# Patient Record
Sex: FEMALE | Race: BLACK OR AFRICAN AMERICAN | NOT HISPANIC OR LATINO | Employment: FULL TIME | URBAN - METROPOLITAN AREA
[De-identification: names, ages, dates, MRNs, and addresses within clinical notes are randomized per-mention and may not be internally consistent; named-entity substitution may affect disease eponyms.]

---

## 2023-07-24 ENCOUNTER — HOSPITAL ENCOUNTER (OUTPATIENT)
Facility: CLINIC | Age: 53
Setting detail: OBSERVATION
Discharge: HOME | End: 2023-07-26
Attending: EMERGENCY MEDICINE | Admitting: EMERGENCY MEDICINE
Payer: COMMERCIAL

## 2023-07-24 DIAGNOSIS — F10.20 ALCOHOL USE DISORDER, SEVERE, DEPENDENCE (H): ICD-10-CM

## 2023-07-24 DIAGNOSIS — F10.939 ALCOHOL WITHDRAWAL SYNDROME WITH COMPLICATION (H): ICD-10-CM

## 2023-07-24 DIAGNOSIS — F39 MOOD DISORDER (H): ICD-10-CM

## 2023-07-24 LAB
ALBUMIN SERPL BCG-MCNC: 4.3 G/DL (ref 3.5–5.2)
ALP SERPL-CCNC: 48 U/L (ref 35–104)
ALT SERPL W P-5'-P-CCNC: 44 U/L (ref 0–50)
AMPHETAMINES UR QL SCN: ABNORMAL
ANION GAP SERPL CALCULATED.3IONS-SCNC: 11 MMOL/L (ref 7–15)
AST SERPL W P-5'-P-CCNC: 30 U/L (ref 0–45)
BARBITURATES UR QL SCN: ABNORMAL
BASOPHILS # BLD AUTO: 0 10E3/UL (ref 0–0.2)
BASOPHILS NFR BLD AUTO: 0 %
BENZODIAZ UR QL SCN: ABNORMAL
BILIRUB SERPL-MCNC: 0.4 MG/DL
BUN SERPL-MCNC: 7.6 MG/DL (ref 6–20)
BZE UR QL SCN: ABNORMAL
CALCIUM SERPL-MCNC: 8.7 MG/DL (ref 8.6–10)
CANNABINOIDS UR QL SCN: ABNORMAL
CHLORIDE SERPL-SCNC: 97 MMOL/L (ref 98–107)
CREAT SERPL-MCNC: 0.77 MG/DL (ref 0.51–0.95)
DEPRECATED HCO3 PLAS-SCNC: 29 MMOL/L (ref 22–29)
EOSINOPHIL # BLD AUTO: 0.1 10E3/UL (ref 0–0.7)
EOSINOPHIL NFR BLD AUTO: 1 %
ERYTHROCYTE [DISTWIDTH] IN BLOOD BY AUTOMATED COUNT: 12.3 % (ref 10–15)
ETHANOL SERPL-MCNC: <0.01 G/DL
GFR SERPL CREATININE-BSD FRML MDRD: >90 ML/MIN/1.73M2
GLUCOSE SERPL-MCNC: 164 MG/DL (ref 70–99)
HCT VFR BLD AUTO: 38.2 % (ref 35–47)
HGB BLD-MCNC: 12.8 G/DL (ref 11.7–15.7)
IMM GRANULOCYTES # BLD: 0 10E3/UL
IMM GRANULOCYTES NFR BLD: 0 %
LYMPHOCYTES # BLD AUTO: 1.4 10E3/UL (ref 0.8–5.3)
LYMPHOCYTES NFR BLD AUTO: 13 %
MAGNESIUM SERPL-MCNC: 2.3 MG/DL (ref 1.7–2.3)
MCH RBC QN AUTO: 30.1 PG (ref 26.5–33)
MCHC RBC AUTO-ENTMCNC: 33.5 G/DL (ref 31.5–36.5)
MCV RBC AUTO: 90 FL (ref 78–100)
MONOCYTES # BLD AUTO: 0.6 10E3/UL (ref 0–1.3)
MONOCYTES NFR BLD AUTO: 6 %
NEUTROPHILS # BLD AUTO: 8.4 10E3/UL (ref 1.6–8.3)
NEUTROPHILS NFR BLD AUTO: 80 %
NRBC # BLD AUTO: 0 10E3/UL
NRBC BLD AUTO-RTO: 0 /100
OPIATES UR QL SCN: ABNORMAL
PCP QUAL URINE (ROCHE): ABNORMAL
PLATELET # BLD AUTO: 265 10E3/UL (ref 150–450)
POTASSIUM SERPL-SCNC: 3 MMOL/L (ref 3.4–5.3)
PROT SERPL-MCNC: 7 G/DL (ref 6.4–8.3)
RBC # BLD AUTO: 4.25 10E6/UL (ref 3.8–5.2)
SODIUM SERPL-SCNC: 137 MMOL/L (ref 136–145)
WBC # BLD AUTO: 10.5 10E3/UL (ref 4–11)

## 2023-07-24 PROCEDURE — 99222 1ST HOSP IP/OBS MODERATE 55: CPT | Performed by: NURSE PRACTITIONER

## 2023-07-24 PROCEDURE — 85025 COMPLETE CBC W/AUTO DIFF WBC: CPT | Performed by: EMERGENCY MEDICINE

## 2023-07-24 PROCEDURE — 80307 DRUG TEST PRSMV CHEM ANLYZR: CPT | Performed by: NURSE PRACTITIONER

## 2023-07-24 PROCEDURE — 250N000013 HC RX MED GY IP 250 OP 250 PS 637: Performed by: NURSE PRACTITIONER

## 2023-07-24 PROCEDURE — 258N000003 HC RX IP 258 OP 636: Performed by: EMERGENCY MEDICINE

## 2023-07-24 PROCEDURE — 96360 HYDRATION IV INFUSION INIT: CPT

## 2023-07-24 PROCEDURE — 82947 ASSAY GLUCOSE BLOOD QUANT: CPT | Performed by: EMERGENCY MEDICINE

## 2023-07-24 PROCEDURE — 96361 HYDRATE IV INFUSION ADD-ON: CPT

## 2023-07-24 PROCEDURE — G0378 HOSPITAL OBSERVATION PER HR: HCPCS

## 2023-07-24 PROCEDURE — 99285 EMERGENCY DEPT VISIT HI MDM: CPT | Mod: 25

## 2023-07-24 PROCEDURE — 90791 PSYCH DIAGNOSTIC EVALUATION: CPT

## 2023-07-24 PROCEDURE — 250N000013 HC RX MED GY IP 250 OP 250 PS 637: Performed by: EMERGENCY MEDICINE

## 2023-07-24 PROCEDURE — 83735 ASSAY OF MAGNESIUM: CPT | Performed by: EMERGENCY MEDICINE

## 2023-07-24 PROCEDURE — 80175 DRUG SCREEN QUAN LAMOTRIGINE: CPT | Performed by: NURSE PRACTITIONER

## 2023-07-24 PROCEDURE — 36415 COLL VENOUS BLD VENIPUNCTURE: CPT | Performed by: EMERGENCY MEDICINE

## 2023-07-24 PROCEDURE — 82077 ASSAY SPEC XCP UR&BREATH IA: CPT | Performed by: EMERGENCY MEDICINE

## 2023-07-24 RX ORDER — DIAZEPAM 5 MG
5 TABLET ORAL
Status: DISCONTINUED | OUTPATIENT
Start: 2023-07-24 | End: 2023-07-26 | Stop reason: HOSPADM

## 2023-07-24 RX ORDER — ESTRADIOL 1 MG/1
1 TABLET ORAL
COMMUNITY
Start: 2023-03-17

## 2023-07-24 RX ORDER — ONDANSETRON 4 MG/1
4 TABLET, ORALLY DISINTEGRATING ORAL EVERY 6 HOURS PRN
Status: DISCONTINUED | OUTPATIENT
Start: 2023-07-24 | End: 2023-07-26 | Stop reason: HOSPADM

## 2023-07-24 RX ORDER — LAMOTRIGINE 25 MG/1
25 TABLET ORAL DAILY
Status: DISCONTINUED | OUTPATIENT
Start: 2023-07-25 | End: 2023-07-26 | Stop reason: HOSPADM

## 2023-07-24 RX ORDER — POTASSIUM CHLORIDE 1.5 G/1.58G
40 POWDER, FOR SOLUTION ORAL ONCE
Status: COMPLETED | OUTPATIENT
Start: 2023-07-24 | End: 2023-07-24

## 2023-07-24 RX ORDER — OLANZAPINE 5 MG/1
5 TABLET ORAL EVERY 6 HOURS PRN
Status: DISCONTINUED | OUTPATIENT
Start: 2023-07-24 | End: 2023-07-26 | Stop reason: HOSPADM

## 2023-07-24 RX ADMIN — POTASSIUM CHLORIDE 40 MEQ: 1.5 POWDER, FOR SOLUTION ORAL at 15:22

## 2023-07-24 RX ADMIN — OLANZAPINE 5 MG: 5 TABLET, FILM COATED ORAL at 22:00

## 2023-07-24 RX ADMIN — SODIUM CHLORIDE 1000 ML: 9 INJECTION, SOLUTION INTRAVENOUS at 15:12

## 2023-07-24 RX ADMIN — DIAZEPAM 5 MG: 5 TABLET ORAL at 21:18

## 2023-07-24 ASSESSMENT — ACTIVITIES OF DAILY LIVING (ADL)
ADLS_ACUITY_SCORE: 35

## 2023-07-24 NOTE — ED NOTES
Tele-PIT/Intake Evaluation      Video-Visit Details    Type of service:  Video Visit    Video Start Time (time video started): 2:19 PM  Video End Time (time video stopped): 2:22 PM   Originating Location (pt. Location):  [facility]  Distant Location (provider location):  same  Mode of Communication:  Video Conference via [employer]  Patient verbally consented to [employer] televisit.    History:  Patient presents with family with request for help with both detox and mental health.  She reports she drinks alcohol frequently, but denies having had any last night or today.  She also reports depression, anxiety, and suicidal ideation.       Exam:    Patient Vitals for the past 24 hrs:   BP Temp Temp src Pulse Resp SpO2   07/24/23 1413 114/65 98  F (36.7  C) Temporal 105 18 98 %       CV:  Mild tachycardia  Resp:  No respiratory distress   Musc:  Normal muscular tone  Neuro:  Speech is somewhat slow, but fluent. Face is symmetric. Moving all extremities well.       Appropriate interventions for symptom management were initiated if applicable.  Appropriate diagnostic tests were initiated if indicated.      I briefly evaluated the patient and developed an initial plan of care. I discussed this plan and explained that this brief interaction does not constitute a full evaluation. Patient/family understands that they should wait to be fully evaluated and discuss any test results with another clinician prior to leaving the hospital.       Garfield Mariee MD  07/24/23 6547

## 2023-07-24 NOTE — ED PROVIDER NOTES
History     Chief Complaint:  Alcohol Problem       HPI   Jose Antonio Dee is a 53 year old female who presents with alcohol and mental health concerns that has been going on for a while. Her children report that the patient has been on her last episode of drinking, not eating, and not bathing since early July. She reports her last drink of alcohol being yesterday and that she is does get shaky and experience withdrawals, but denies currently experiencing one. She reports asking for help and her children report concern for her not taking care of herself bringing her in today. Her children report that she has had minimal hospital treatment in the past and desire to look into [facility]. They report her being on medications for anxiety or depression which the patient reports being 4-5 days since she last took her medications. Her children report that she lives at home with her partner and younger sister. She denies seizure withdrawals, recent illness, nausea, emesis, diarrhea, suicidal ideations, or cough.    Independent Historian:    Son and daughter as stated above.    Medications:    Zyrtec  Vitamin D  Vitamin B-12  Estrace  Lamictal  Inderal   Estrace  Climara    Past Medical History:    Anxiety  Cyclothymic disorder  Depression  Anemia    Past Surgical History:    Breast biopsy   Partial hysterectomy      Physical Exam   Patient Vitals for the past 24 hrs:   BP Temp Temp src Pulse Resp SpO2   07/24/23 1413 114/65 98  F (36.7  C) Temporal 105 18 98 %        Physical Exam  VS: Reviewed per above  HENT: Mucous membranes moist  EYES: sclera anicteric  CV: Rate as noted,  regular rhythm.   RESP: Effort normal. Breath sounds are normal bilaterally.  GI: no tenderness/rebound/guarding, not distended.  NEURO: Alert, moving all extremities. No tremulousness, no tongue fasciculations  PSYCH:denies SI, AH/VH  MSK: No deformity of the extremities  SKIN: Warm and dry    Emergency Department Course     Laboratory:  Labs Ordered  and Resulted from Time of ED Arrival to Time of ED Departure   COMPREHENSIVE METABOLIC PANEL - Abnormal       Result Value    Sodium 137      Potassium 3.0 (*)     Chloride 97 (*)     Carbon Dioxide (CO2) 29      Anion Gap 11      Urea Nitrogen 7.6      Creatinine 0.77      Calcium 8.7      Glucose 164 (*)     Alkaline Phosphatase 48      AST 30      ALT 44      Protein Total 7.0      Albumin 4.3      Bilirubin Total 0.4      GFR Estimate >90     CBC WITH PLATELETS AND DIFFERENTIAL - Abnormal    WBC Count 10.5      RBC Count 4.25      Hemoglobin 12.8      Hematocrit 38.2      MCV 90      MCH 30.1      MCHC 33.5      RDW 12.3      Platelet Count 265      % Neutrophils 80      % Lymphocytes 13      % Monocytes 6      % Eosinophils 1      % Basophils 0      % Immature Granulocytes 0      NRBCs per 100 WBC 0      Absolute Neutrophils 8.4 (*)     Absolute Lymphocytes 1.4      Absolute Monocytes 0.6      Absolute Eosinophils 0.1      Absolute Basophils 0.0      Absolute Immature Granulocytes 0.0      Absolute NRBCs 0.0     ETHYL ALCOHOL LEVEL - Normal    Alcohol ethyl <0.01     MAGNESIUM - Normal    Magnesium 2.3        Emergency Department Course & Assessments:         Interventions:  Medications   potassium chloride (KLOR-CON) Packet 40 mEq (has no administration in time range)   0.9% sodium chloride BOLUS (1,000 mLs Intravenous $New Bag 7/24/23 1512)        Assessments:  1456 I obtained history and examined the patient as noted above.   1517 I rechecked and updated the patient.    Independent Interpretation (X-rays, CTs, rhythm strip):  None    Consultations/Discussion of Management or Tests:  None       Social Determinants of Health affecting care:  Stress/Adjustment Disorders     Disposition:  The patient was transferred to [facility].     Impression & Plan    CMS Diagnoses: None    Medical Decision Making:  Patient presents to the ER with her children for evaluation of failure to thrive, alcohol abuse, depression.   Vital signs notable for low-grade tachycardia.  Clinically I do not appreciate signs of obvious alcohol withdrawal at this time.  Alcohol level is negative.  Other basic labs reveal mild hypokalemia which is replaced orally.  No emergent medical condition identified.  Plan to expedite [facility] unit for further psychiatric evaluation.    Diagnosis:    ICD-10-CM    1. Hypokalemia  E87.6       2. Depression, unspecified depression type  F32.A       3. Alcohol abuse  F10.10            Discharge Medications:  New Prescriptions    No medications on file      Scribe Disclosure:  I, Surinder Valdovinos, am serving as a scribe at 2:56 PM on 7/24/2023 to document services personally performed by Aquiles Younger MD based on my observations and the provider's statements to me.               Aquiles Younger MD  07/24/23 5170

## 2023-07-24 NOTE — Clinical Note
Jose Antonio Dee was seen and treated in our emergency department on 7/24/2023.  She may return to work on 07/31/2023.       If you have any questions or concerns, please don't hesitate to call.      Tonya Hare, CECILIA CNP

## 2023-07-24 NOTE — ED TRIAGE NOTES
"    Patient presents to the ED with family.  Family member states that patient would like detox and a mental health assessment.  Patient denies suicidal ideation, however daughter states \"shes not telling the truth\" Other family member phrases patient's menta status as \"giving up\" and patient agrees.  Patient appears intoxicated at this time.   "

## 2023-07-24 NOTE — PROGRESS NOTES
6 or 7 strings of  waist beads noted during security check during intake process. Patient states they are not removable.

## 2023-07-24 NOTE — PROGRESS NOTES
"53 year old femal with history of depression received from ED due to depression. Reports not sleeping well. denies SI/HI. Nursing and risk assessments completed. Assessments reviewed with LMHP and physician. Admission information reviewed with patient. Patient given a tour of [facility] and instructions on using the facility. Questions regarding [facility] addressed. Pt safety search completed.       Patient is noted to be having a hard time concentrating during intake. She is slow to respond and her answers do not always make sense. Example - when asked how tall she is she states \"I don't know maybe a hundred or two.\" Also when she used the bathroom she just sat on the toilet with out taking down her pants and urinated through her scrubs. Her gait is unstable at times. She states she has been depressed for a week. States she has been sleeping only 3 or 4 hours a night. When asked what prompted this episodes of depression she states \"Just some stress.\" With further questioning she states \"the only thing I can think of is that I have to bring my daughter back to college.\"  Denies SI/SIB. Denies AH/VH though she states \"I had to think about that question\" She endorses drinking Alcohol.  Admits to 6 or 7 shots a day. Endorses history of tremors with  Withdrawal. Affect is flat. She appears internally preoccupied. Mood is depressed and a bit tense.   "

## 2023-07-25 PROCEDURE — 99233 SBSQ HOSP IP/OBS HIGH 50: CPT

## 2023-07-25 PROCEDURE — G0378 HOSPITAL OBSERVATION PER HR: HCPCS

## 2023-07-25 PROCEDURE — 250N000013 HC RX MED GY IP 250 OP 250 PS 637: Performed by: NURSE PRACTITIONER

## 2023-07-25 PROCEDURE — 250N000013 HC RX MED GY IP 250 OP 250 PS 637: Performed by: PSYCHIATRY & NEUROLOGY

## 2023-07-25 RX ORDER — IBUPROFEN 200 MG
200-600 TABLET ORAL EVERY 6 HOURS PRN
Status: DISCONTINUED | OUTPATIENT
Start: 2023-07-25 | End: 2023-07-26 | Stop reason: HOSPADM

## 2023-07-25 RX ADMIN — DIAZEPAM 5 MG: 5 TABLET ORAL at 17:00

## 2023-07-25 RX ADMIN — OLANZAPINE 5 MG: 5 TABLET, FILM COATED ORAL at 17:00

## 2023-07-25 RX ADMIN — LAMOTRIGINE 25 MG: 25 TABLET ORAL at 11:11

## 2023-07-25 RX ADMIN — IBUPROFEN 200 MG: 200 TABLET, FILM COATED ORAL at 17:00

## 2023-07-25 ASSESSMENT — ACTIVITIES OF DAILY LIVING (ADL)
ADLS_ACUITY_SCORE: 35

## 2023-07-25 NOTE — PROGRESS NOTES
Patient appears more coherent now than on arrival. Her speech remains slow  but the answers are in line with the questions. Continues to appear a bit internally preoccupied. Affect is blunt. Mood is anxious, tense. Med compliant. Pleasant and cooperative.  Plan to re assess in the morning.

## 2023-07-25 NOTE — ED NOTES
This writer witnessed patient slide out of her recliner onto the floor. Pt did not hit her head and informed another nurse that she did not hurt herself.

## 2023-07-25 NOTE — ED PROVIDER NOTES
"[facility] - Initial Psychiatric Observation Note  [facility] Emergency Department  Observation Initiation Date: Jul 24, 2023    Jose Antonio Dee MRN: [id number]   Age: 53 year old YOB: 1970     History     Chief Complaint   Patient presents with     Alcohol Problem     HPI  Jose Antonio Dee is a 53 year old female with a past history notable for alcohol use disorder, mood disorder and anxiety who presents to the ED for mental health and CD concerns.  Patient was evaluated by the ED provider, who medically cleared patient to transfer to [facility] for psychiatric assessment, this is reviewed along with all pertinent labs and tests performed.    On examination, patient presents with disorganized thoughts, delayed speech, and difficulty focusing and concentrating. She replies \"kids like depression\" as her main symptom. She is a poor historian and unable to provide accurate information. She denies chronic alcohol use, reports drinking for the past week and denies any symptoms of withdrawal, however comments her kids are worried about her going through \"detox.\" Patient states she is \"no longer having shakes and is able to walk now.\" She reports poor intake and nausea. IV fluids were administered in the ED. Patient requesting Zofran. She's works with a psychiatric provider. She is unable to recall the name of her medications. She has not taken her medications over the past few days. She denies any suicidal thoughts. She denies any auditory or visual hallucinations. Her responses appear slow and deliberate. She states she is here for \"just a break\" as she feels overwhelmed with life in general.    Past Medical History  No past medical history on file.  No past surgical history on file.  estradiol (ESTRACE) 1 MG tablet      Allergies   Allergen Reactions     Oxycodone Itching     Family History  No family history on file.  Social History           Review of Systems  A medically appropriate review of systems was " "performed with pertinent positives and negatives noted in the HPI, and all other systems negative.    Physical Examination   BP: 114/65  Pulse: 105  Temp: 98  F (36.7  C)  Resp: 18  Height: 172.7 cm (5' 8\")  Weight: 83.6 kg (184 lb 3.2 oz)  SpO2: 98 %    Physical Exam  General: Appears stated age.   Neuro: Alert and fully oriented. Extremities appear to demonstrate normal strength on visual inspection.   Integumentary/Skin: no rash visualized, normal color    Psychiatric Examination   Appearance: fatigued and slightly unkempt  Attitude:  evasive  Eye Contact:  poor   Mood:  anxious  Affect:  intensity is blunted  Speech:  delayed, slow, deliberate  Psychomotor Behavior:  no evidence of tardive dyskinesia, dystonia, or tics and physical retardation  Thought Process:  non-linear, illogical at times  Associations:  no loose associations  Thought Content:  no evidence of suicidal ideation or homicidal ideation and no evidence of psychotic thought  Insight:  limited  Judgement:  limited  Oriented to:  person, place and situation  Attention Span and Concentration:  limited  Recent and Remote Memory:  poor  Language: able to name/identify objects without impairment  Fund of Knowledge:impaired due to cognitive slowing    ED Course        Labs Ordered and Resulted from Time of ED Arrival to Time of ED Departure   COMPREHENSIVE METABOLIC PANEL - Abnormal       Result Value    Sodium 137      Potassium 3.0 (*)     Chloride 97 (*)     Carbon Dioxide (CO2) 29      Anion Gap 11      Urea Nitrogen 7.6      Creatinine 0.77      Calcium 8.7      Glucose 164 (*)     Alkaline Phosphatase 48      AST 30      ALT 44      Protein Total 7.0      Albumin 4.3      Bilirubin Total 0.4      GFR Estimate >90     CBC WITH PLATELETS AND DIFFERENTIAL - Abnormal    WBC Count 10.5      RBC Count 4.25      Hemoglobin 12.8      Hematocrit 38.2      MCV 90      MCH 30.1      MCHC 33.5      RDW 12.3      Platelet Count 265      % Neutrophils 80      % " Lymphocytes 13      % Monocytes 6      % Eosinophils 1      % Basophils 0      % Immature Granulocytes 0      NRBCs per 100 WBC 0      Absolute Neutrophils 8.4 (*)     Absolute Lymphocytes 1.4      Absolute Monocytes 0.6      Absolute Eosinophils 0.1      Absolute Basophils 0.0      Absolute Immature Granulocytes 0.0      Absolute NRBCs 0.0     DRUG ABUSE SCREEN 77 URINE (FL, RH, SH) - Abnormal    Amphetamines Urine Screen Negative      Barbituates Urine Screen Negative      Benzodiazepine Urine Screen Negative      Cannabinoids Urine Screen Positive (*)     Opiates Urine Screen Negative      PCP Urine Screen Negative      Cocaine Urine Screen Negative     ETHYL ALCOHOL LEVEL - Normal    Alcohol ethyl <0.01     MAGNESIUM - Normal    Magnesium 2.3     LAMOTRIGINE LEVEL       Assessments & Plan (with Medical Decision Making)   Patient presenting with concerns for anxiety and depression as well as alcohol related disorder further complicated by patient's altered level of arousal and minimizing alcohol use. It seems family is concerned for her wellbeing and functioning as they voice concerns for withdrawal. Patient described experiencing physical symptoms consistent with alcohol withdrawal over the past few days and may still be exhibiting residual cognitive effects.     Nursing notes reviewed noting no acute issues.     I have reviewed the assessment completed by the [facility].     During the observation period, the patient did not require medications for agitation, and did not require restraints/seclusion for patient and/or provider safety.     The patient was found to have a psychiatric condition that would benefit from an observation stay in the emergency department for further psychiatric stabilization and/or coordination of a safe disposition. The observation plan includes serial assessments of psychiatric condition, potential administration of medications if indicated, further disposition pending the patient's  "psychiatric course during the monitoring period.     Preliminary diagnosis:    ICD-10-CM    1. Mood disorder (H)  F39       2. Alcohol withdrawal syndrome with complication (H)  F10.939       3. Alcohol use disorder, severe, dependence (H)  F10.20            Treatment Plan:  -observation status for stabilization, medication management and symptom management.  -Resume PTA medication: Lamotrigine 50 mg daily. Will need to restart at 25 mg daily due to being off it for over 5 days.  -Will have valium and zyprexa available for symptoms of increased anxiety and/or psychosis. Opted not to order CIWA protocol as patient likely would score low due to lack of physical symptoms, however could benefit from valium due to cognitive symptoms.  -Urine toxicology ordered and reviewed. Positive for cannabis, which patient reports using \"gummies\" a couple times a week.  -observe overnight and reassess tomorrow.  -May benefit from a CD evaluation if agreeable.     --  CECILIA Lomax CNP   [facility] EMERGENCY DEPT  [facility] Unit     Kendy Vega APRN CNP  07/24/23 5242    "

## 2023-07-25 NOTE — PROGRESS NOTES
"Triage & Transition Services [facility]     Progress Note    Patient: Jose Antonio goes by \"Jose Antonio,\" uses she/her pronouns  Date of Service: July 25, 2023  Site of Service: [facility] ED - [facility]  Patient was seen in-person.       Presenting problem:  Please see initial [facility] Crisis Assessment completed by VICKIE Crow on 7/24/2023 for complete assessment information. Notable concerns include anxiety, Significant behavioral change, Substance use.   Factors that make the mental health crisis life threatening or complex are:  Pt experiencing intensive anxiety due to daughter leaving for college.Pt reports racing thoughts, worry, difficulty concentrating, poor sleep, poor appetite, stomache pain, headaches. Pt likely experiencing some withdrawal sxs from etoh. Pt denies SI or HI, any sxs of alexx or psychosis. Pt reports that with feeling overwhelmed with worry about her daughter, she has been drinking more and using THC gummies 1-2 times/week.       Individuals Present: Fannie Brady LICSW & Venus Herrmann MA  Session start: 3:45PM  Session end: 4:25PM  Session duration in minutes: 40 minutes  CPT utilized: 67523 - Psychotherapy (with patient) - 45 (38-52*) min      Current Presentation:   Writer and [facility] Venus met with patient in consult room A for reassessment. Patient came to this visit willingly and engaged appropriately. She is alert & oriented. Patient reports improved symptoms today. She feels that being in a quiet environment, having time to think, and time to focus on her wellbeing without distraction has been very helpful. Patient denies suicidal ideation. Thought content is organized and logical. Patient reports her sleep has improved, however her appetite is still poor. Discussed barriers to self care and treatment following discharge. Patient discussed plan to send daughter off to college and potentially move to another state for more familial support to keep her accountable " in her sobriety. Patient plans to remain at [facility] overnight for continued observation, treatment, and stabilization. She does have care established with an outpatient psychiatrist. Discussed options for outpatient therapy, however patient does not particularly like all the talking involved with therapy. Patient is interested in the partial hospitalization program.       Additional Collateral Information:  None obtained today.        Mental Status Exam   Affect: Appropriate   Appearance: Appropriate    Attention Span/Concentration: Attentive  Eye Contact: Engaged   Fund of Knowledge: Appropriate    Language /Speech Content: Fluent   Language /Speech Volume: Normal    Language /Speech Rate/Productions: Normal    Recent Memory: Intact   Remote Memory: Intact   Mood: Normal    Orientation to Person: Yes    Orientation to Place: Yes   Orientation to Time of Day: Yes    Orientation to Date: Yes    Situation (Do they understand why they are here?): Yes    Psychomotor Behavior: Normal    Thought Content: Clear   Thought Form: Goal Directed and Intact     Diagnosis:   Mood disorder (H) F39    Alcohol withdrawal syndrome with complication (H) F10.939    Alcohol use disorder, severe, dependence (H)        Therapeutic Intervention(s):   Provided active listening, unconditional positive regard, and validation. Engaged in safety planning.  Reviewed healthy living that supports positive mental health, including looking at sleep hygiene, regular movement, nutrition, and regular socialization. Provided positive reinforcement for progress towards goals, gains in knowledge, and application of skills previously taught.  Worked on relapse prevention planning (review of stressors, early warning signs, written plan to respond to signs, and rehearse plan). Identified and practiced coping skills. Identified stress relief practices.      Treatment Objective(s) Addressed:   The focus of this session was on rapport building, identifying and  practicing coping strategies, safety planning, identifying an appropriate aftercare plan, assessing safety, building self-esteem, identifying additional supports, and exploring obstacles to safety in the community.       Progress Towards Goals:   Patient reports improving symptoms. Patient is making progress towards treatment goals as evidenced by verbal report; patient reports improved sleep and mood.       Case Management:   Discussed referral for IOP prior to discharge tomorrow.       Plan and Clinical Summary and Substantiation of Recommendations:   Observation: A lower level of care has been unsuccessful in treating and stabilizing patient s mental health symptoms. Patient will remain on [facility] unit under observation for continued monitoring, treatment and therapeutic intervention of mental health symptoms. Observation at [facility] could help mitigate the need for a more restrictive level of care in an inpatient setting.        Attending concurs with disposition: Yes         ERIN Martin

## 2023-07-25 NOTE — CONSULTS
Diagnostic Evaluation Consultation  Crisis Assessment    Patient Name: Jose Antonio Dee  Age:  53 year old  Legal Sex: female  Gender Identity: female  Pronouns: she/her  Race: Black or   Ethnicity: Not  or   Language: English      Patient was assessed: In person  Patient location: [facility] EMERGENCY DEPT     Referral Data and Chief Complaint  Jose Antonio Dee is a 53 year old, female who identifies as female and speaks English.  race is Black or  and ethnicity is Not  or .  Jose Antonio Dee presents to the ED with family/friends. Patient is presenting to the ED for the following concerns: Anxiety, Significant behavioral change, Substance use.   Factors that make the mental health crisis life threatening or complex are:  Pt experiencing intensive anxiety due to daughter leaving for college.Pt reports racing thoughts, worry, difficulty concentrating, poor sleep, poor appetite, stomache pain, headaches. Pt likely experiencing some withdrawal sxs from etoh. Pt denies SI or HI, any sxs of alexx or psychosis. Pt reports that with feeling overwhelmed with worry about her daughter, she has been drinking more and using THC gummies 1-2 times/week..        Informed Consent and Assessment Methods  Explained the crisis assessment process, including applicable information disclosures and limits to confidentiality, assessed understanding of the process, and obtained consent to proceed with the assessment.  Assessment methods included conducting a formal interview with patient, review of medical records, collaboration with medical staff, and obtaining relevant collateral information from family and community providers when available: done     Patient response to interventions: acceptance expressed  Coping skills were attempted to reduce the crisis:  Pt reports limited coping. She reports that she has been drinking excessively and per collateral, has been  socially withdrawn and not reaching out for help.     History of the Crisis   Pt reports history of intensive childhood trauma that surfaces when pt daughter leaves for college. Pt reports that 3 years ago when daughter first went to college, pt became suicidal and exhibited some cutting bx and had SI. She denies any such self harm or SI currently. Pt reports that she works with a psychiatrist and typically manages sxs adequately but gets overwhelmed when daughter leaves. Pt reports that she tends to drink more during such times of stress.    Brief Psychosocial History  Family:  Lives with Significant Other, Children yes (Pt reports adult children present in her life)  Support System:  Parent(s), Children (mother and 2 oldest kids)  Employment Status:  employed full-time  Source of Income:  salary/wages  Financial Environmental Concerns:  No concerns identified  Current Hobbies:  arts/crafts, family functions  Barriers in Personal Life:   (none reported)    Significant Clinical History  Current Anxiety Symptoms:  racing thoughts, excessive worry, anxious  Current Depression/Trauma:  avoidance, difficulty concentrating, impaired decision making  Current Somatic Symptoms:  racing thoughts, excessive worry, anxious, somatic symptoms (abdominal pain, headache, tension), sweating, flushing, shaking (Pt reports intense stomache pain and slight headache and some shakiness)  Current Psychosis/Thought Disturbance:     Current Eating Symptoms:  loss of appetite  Chemical Use History:  Alcohol: Daily (excessive use past 2 weeks)  Last Use:: 07/23/23  Benzodiazepines: None  Opiates: None  Cocaine: None  Marijuana: Occasional (THC gummies)  Last Use:: 07/23/23  Other Use: None  Withdrawal Symptoms: Nausea (nausea and some shakiness)   Past diagnosis:  Anxiety Disorder, Depression  Family history:  No known history of mental health or chemical health concerns  Past treatment:  Psychiatric Medication Management, Individual  "therapy  Details of most recent treatment:  Pt currently working with NP with [facility] and this is her only provider. Writer recommends therapist and pt report not currently interested  Other relevant history:          Collateral Information  Is there collateral information: Yes   Collateral information name, relationship, phone number:  Writer spoke with both pt son Pablo ([phone]) and daughter Manasa ([phone])  What happened today: Most data was obtained from Manasa. She reports that pt has been experiencing \"episodes\" of binge drinking that occurs wvery 4-6 months. She reports this recent \"binnge\" started on July 12 and she drinks every day, and will drink cough syrup if other etoh is not available. She reports pt drinks heavily and stops eating and bathing. She repors by July 15 she had gone to her mother's home and has been staying on her couch and drinking, and ignoring ADLs. She reports the family discussed this and felt she needed treatment and they brought her to the ED for [facility].   What is different about patient's functioning: Drinking daily excessively since 7/12. Diminishing ADLS, neglecting to eat or drink water.   Concern about alcohol/drug use: yes (etoh - see above, daily and excessive)  What do you think the patient needs:    Has patient made comments about wanting to kill themselves/others: yes (Yes, but since about June of 2022. Manasa reports that pt made cuts to arm at that time and spoke of jumping out into traffic.)  If d/c is recommended, can they take part in safety/aftercare planning:  yes (Yes, both Manasa and Pablo will remain supports for pt)  Additional collateral information:  Manasa and Pablo both report belief pt needs BRIAN treatment due to etoh     Risk Assessment  [place] Suicide Severity Rating Scale Full Clinical Version:  Suicidal Ideation  Q1 Wish to be Dead (Lifetime): Yes  Q2 Non-Specific Active Suicidal Thoughts (Lifetime): Yes  3. Active Suicidal Ideation " with any Methods (Not Plan) Without Intent to Act (Lifetime): Yes (pt reports that she exhibited cutting bx and as she did this, she had thoughts that this might kill her)  Q4 Active Suicidal Ideation with Some Intent to Act, Without Specific Plan (Lifetime): No  Q5 Active Suicidal Ideation with Specific Plan and Intent (Lifetime): No  Q6 Suicide Behavior (Lifetime): no     Suicidal Behavior (Lifetime)  Actual Attempt (Lifetime): No  Has subject engaged in non-suicidal self-injurious behavior? (Lifetime): Yes (pt report briefly exhibiting cutting behaviors 3 years ago when her daughter first went to college)  Interrupted Attempts (Lifetime): No  Aborted or Self-Interrupted Attempt (Lifetime): No  Preparatory Acts or Behavior (Lifetime): No    [place] Suicide Severity Rating Scale Since Last Contact:   ZZZ  Actual Attempt (Lifetime): No  Has subject engaged in non-suicidal self-injurious behavior? (Lifetime): Yes (pt report briefly exhibiting cutting behaviors 3 years ago when her daughter first went to college)  Interrupted Attempts (Lifetime): No  Aborted or Self-Interrupted Attempt (Lifetime): No  Preparatory Acts or Behavior (Lifetime): No  Intensity of Ideation (Recent)  Most Severe Ideation Rating (Past 1 Month): 1  Description of Most Severe Ideation (Past 1 Month): 0  Frequency (Past 1 Month): Less than once a week  Duration (Past 1 Month): Fleeting, few seconds or minutes  Controllability (Past 1 Month): Does not attempt to control thoughts  Deterrents (Past 1 Month): Does not apply  Reasons for Ideation (Past 1 Month): Does not apply  Suicidal Behavior (Recent)  Actual Attempt (Past 3 Months): No  Total Number of Actual Attempts (Past 3 Months): 0  Actual Attempt Description (Past 3 Months): 0  Has subject engaged in non-suicidal self-injurious behavior? (Past 3 Months): No  Interrupted Attempts (Past 3 Months): No  Total Number of Interrupted Attempts (Past 3 Months): 0  Interrupted Attempt Description  (Past 3 Months): 0  Aborted or Self-Interrupted Attempt (Past 3 Months): No  Total Number of Aborted or Self-Interrupted Attempts (Past 3 Months): 0  Aborted or Self-Interrupted Attempt Description (Past 3 Months): 0  Preparatory Acts or Behavior (Past 3 Months): No  Total Number of Preparatory Acts (Past 3 Months): 0  Preparatory Acts or Behavior Description (Past 3 Months): 0    Environmental or Psychosocial Events: other life stressors, recent life events (see comment) (pt daughter leaving for college, historically difficult for pt)  Protective Factors: Protective Factors: strong bond to family unit, community support, or employment, intact marriage or domestic partnership, responsibilities and duties to others, including pets and children, lives in a responsibly safe and stable environment, sense of importance of health and wellness, able to access care without barriers, good treatment engagement, supportive ongoing medical and mental health care relationships, good problem-solving, coping, and conflict resolution skills, cultural, spiritual , or Taoist beliefs associated with meaning and value in life, optimistic outlook - identification of future goals    Does the patient have thoughts of harming others? Feels Like Hurting Others: no  Previous Attempt to Hurt Others: no  Current presentation: Confused (mildly confused yet lucid and able to answer all questions)  Violence Threats in Past 6 Months: No  Current Violence Plan or Thoughts: No  Is the patient engaging in sexually inappropriate behavior?: no  Duty to warn initiated: no  Duty to warn details: NA    Is the patient engaging in sexually inappropriate behavior?  no        Current Substance Abuse  Is there recent substance abuse?   Yes, excessive etoh and occasional cannabis  Was a urine drug screen or blood alcohol level obtained: yes (etoh - see above, daily and excessive) yes, positive for cannabis  Mental health and substance abuse treatment  history:  Outpt psychiatry and therapy, currently only outpt psychiatry     Mental Status Exam   Affect: Other (pt presents somewhat blunted and may be experiencing etoh withdrawal)  Appearance: Disheveled  Attention Span/Concentration: Inattentive  Eye Contact: Variable    Fund of Knowledge: Delayed   Language /Speech Content: Non-Fluent  Language /Speech Volume: Normal  Language /Speech Rate/Productions: Minimally Responsive  Recent Memory: Variable  Remote Memory: Variable  Mood: Anxious, Other (please comment) (pt nauseous and likely going through withdawals)  Orientation to Person: Yes   Orientation to Place: Yes  Orientation to Time of Day: Yes  Orientation to Date: Yes     Situation (Do they understand why they are here?): Yes  Psychomotor Behavior: Agitated  Thought Content: Other (please comment) (Pt likely going through withdrawals and diffficult to assess)  Thought Form: Other (please comment) (Pt likely going through withdrawals from etoh and difficult to assess)     Mini-Cog Assessment  Number of Words Recalled:    Clock-Drawing Test:     Three Item Recall:    Mini-Cog Total Score:       Medication  Psychotropic medications:   Medication Orders - Psychiatric (From admission, onward)      Start     Dose/Rate Route Frequency Ordered Stop    07/24/23 2053  OLANZapine (zyPREXA) tablet 5 mg         5 mg Oral EVERY 6 HOURS PRN 07/24/23 2053 07/24/23 2046  diazepam (VALIUM) tablet 5 mg         5 mg Oral EVERY 2 HOURS PRN 07/24/23 2046               Current Care Team  Patient Care Team:  No Ref-Primary, Physician as PCP - Darrell Reyes CNP as Nurse Practitioner (Psychiatry)    Diagnosis  Patient Active Problem List   Diagnosis Code    Mood disorder (H) F39    Alcohol withdrawal syndrome with complication (H) F10.939    Alcohol use disorder, severe, dependence (H) F10.20       Clinical Summary and Substantiation of Recommendations   Pt presents to ED for excessive etoh use and diminishing ADLs. Pt  reports intensive worry, racing thoughts, difficulty concentrating, poor sleep, poor appetite andexcessive etoh use. Pt denies SI although pt collateral reports that pt has hx of expressing SI when using etoh excessively and she believes pt is likely minimizing SI.      Patient coping skills attempted to reduce the crisis:  Pt reports limited coping. She reports that she has been drinking excessively and per collateral, has been socially withdrawn and not reaching out for help.    Disposition  Recommended disposition: Individual Therapy, Medication Management, Rule 25/BRIAN Assessment        Reviewed case and recommendations with attending provider. Attending Name: Kendy Vega CNP       Attending concurs with disposition: yes       Patient and/or validated legal guardian concurs with disposition:   no (Pt report not interested in therapy or BRIAN assessment. Pt likely going through withdrawals and both will be encouraged again in AM when LMHP re-assessed)       Final disposition:  observation    Legal status on admission: Voluntary/Patient has signed consent for treatment    Assessment Details   Total duration spent on the patient case in minutes: 45 min     CPT code(s) utilized: 20595 - Psychotherapy for Crisis - 60 (30-74*) min    Geovanny Wilson Psychotherapist  [facility] - Triage & Transition Services  Callback: [phone]

## 2023-07-25 NOTE — ED PROVIDER NOTES
"EmPATH Unit - Psychiatric Consultation  [facility] Emergency Department    Jose Antonio Dee MRN: [id number]   Age: 53 year old YOB: 1970     History     Chief Complaint   Patient presents with    Alcohol Problem     HPI  Jose Antonio Dee is a 53 year old female with history notable for alcohol use disorder, anxiety, and mood disorder who presented to the emergency department with increasing anxiety and alcohol use in the context of social stressors and medication nonadherence. In the emergency department, Jose Antonio was determined to be medically stable and transferred to the Em[facility] unit for psychiatric assessment. Jose Antonio was initially seen by Kendy Vega CNP and lamotrigine was restarted. They have currently been in the emergency department for 24 hours.     On approach, Jose Antonio was sleeping in a recliner yet awoke easily. She was agreeable to be interviewed and accompanied writer to consult room. Jose Antonio reports that she feels like she's \"getting rest\" and that her \"mind is quieting down.\" She denies SI and denies AH/VH. She reports that she was able to sleep last night and that this was the \"best sleep in a longtime.\" She reports a poor appetite and that she's only had juice and a cheese stick today but also notes that she's been sleeping a majority of the day. She was restarted on Lamotrigine yesterday and denies side effects. She notes that she had stopped taking this because she didn't go to the pharmacy to pick-up her refill.  When taking this, she feels as though her mental health symptoms are well controlled. Writer asked about recent alcohol and substance use. Jose Antonio states that she drinks occasionally but that she was here for rest, not substance use. She has an outpatient psychiatrist that she sees quarterly, last appointment was 6/30/23. She does not have a therapist. Writer attempted to discuss referral for psychotherapy upon discharge, Jose Antonio declined stating \"I don't need that.\" [facility] to [facility] tomorrow.     Past Medical History  No past medical history on file.  No past surgical history on file.  estradiol (ESTRACE) 1 MG tablet      Allergies   Allergen Reactions    Oxycodone Itching     Family History  No family history on file.  Social History           Review of Systems  A medically appropriate review of systems was performed with pertinent positives and negatives noted in the HPI, and all other systems negative.    Physical Examination   BP: 114/65  Pulse: 105  Temp: 98  F (36.7  C)  Resp: 18  Height: 172.7 cm (5' 8\")  Weight: 83.6 kg (184 lb 3.2 oz)  SpO2: 98 %    Physical Exam  General: Appears stated age.   Neuro: Alert and fully oriented. Extremities appear to demonstrate normal strength on visual inspection.   Integumentary/Skin: no rash visualized, normal color    Psychiatric Examination   Appearance: adequately groomed, dressed in hospital scrubs, appeared as age stated, and sleeping in recliner yet awoke easily   Attitude:  cooperative  Eye Contact:  fair  Mood:  depressed and better  Affect:  mood congruent and intensity is blunted  Speech:  clear, coherent and normal prosody  Psychomotor Behavior:  no evidence of tardive dyskinesia, dystonia, or tics and intact station, gait and muscle tone  Thought Process:  logical  Associations:  no loose associations  Thought Content:  no evidence of suicidal ideation or homicidal ideation, no evidence of psychotic thought, no auditory hallucinations present, and no visual hallucinations present  Insight:  fair  Judgement:  fair  Oriented to:  time, person, and place  Attention Span and Concentration:  fair  Recent and Remote Memory:  fair  Language: able to name/identify objects without impairment  Fund of Knowledge: intact with awareness of current and past events    ED Course        Labs Ordered and Resulted from Time of ED Arrival to Time of ED Departure   COMPREHENSIVE METABOLIC PANEL - " Abnormal       Result Value    Sodium 137      Potassium 3.0 (*)     Chloride 97 (*)     Carbon Dioxide (CO2) 29      Anion Gap 11      Urea Nitrogen 7.6      Creatinine 0.77      Calcium 8.7      Glucose 164 (*)     Alkaline Phosphatase 48      AST 30      ALT 44      Protein Total 7.0      Albumin 4.3      Bilirubin Total 0.4      GFR Estimate >90     CBC WITH PLATELETS AND DIFFERENTIAL - Abnormal    WBC Count 10.5      RBC Count 4.25      Hemoglobin 12.8      Hematocrit 38.2      MCV 90      MCH 30.1      MCHC 33.5      RDW 12.3      Platelet Count 265      % Neutrophils 80      % Lymphocytes 13      % Monocytes 6      % Eosinophils 1      % Basophils 0      % Immature Granulocytes 0      NRBCs per 100 WBC 0      Absolute Neutrophils 8.4 (*)     Absolute Lymphocytes 1.4      Absolute Monocytes 0.6      Absolute Eosinophils 0.1      Absolute Basophils 0.0      Absolute Immature Granulocytes 0.0      Absolute NRBCs 0.0     DRUG ABUSE SCREEN 77 URINE (FL, RH, SH) - Abnormal    Amphetamines Urine Screen Negative      Barbituates Urine Screen Negative      Benzodiazepine Urine Screen Negative      Cannabinoids Urine Screen Positive (*)     Opiates Urine Screen Negative      PCP Urine Screen Negative      Cocaine Urine Screen Negative     ETHYL ALCOHOL LEVEL - Normal    Alcohol ethyl <0.01     MAGNESIUM - Normal    Magnesium 2.3     LAMOTRIGINE LEVEL       Assessments & Plan (with Medical Decision Making)   Patient presenting with increasing anxiety and depression in the context of alcohol use and recent medication lapse. Patient does not endorse alcohol use as being problematic yet family members have voiced concerns. Treatment plan focused on resuming medications further targeting mood stabilization, Lamotrigine. Patient would benefit from psychotherapy and Rule 25 assessment. Nursing notes reviewed noting no acute issues.     I have reviewed the assessment completed by the [facility].     Preliminary diagnosis:     ICD-10-CM    1. Mood disorder (H)  F39       2. Alcohol withdrawal syndrome with complication (H)  F10.939       3. Alcohol use disorder, severe, dependence (H)  F10.20            Treatment Plan:  -Continue lamotrigine 25mg for 2 weeks and then increase to 50mg further targeting mood stabilization. Patient will need to follow-up with outpatient psychiatrist for additional dose optimization  -Comfort medications ordered including Zyprexa and valium   -Patient would benefit from Rule 25 although is declining BRIAN services at this time  -Patient would benefit from psychotherapy although is declining at this time  -Referral to transition clinic at discharge  -Problem focused supportive therapy and education provided today related to patient's current and acute stressors, symptoms, and diagnoses.   -Remain at [facility] under observation with reassessment and likely discharge tomorrow     --  CECILIA Watts CNP   [facility] EMERGENCY DEPT  EmPATH Unit       Tonya Hare APRN CNP  07/25/23 1980

## 2023-07-25 NOTE — PROGRESS NOTES
[facility] Progress Note    Client Name: Jose Antonio Dee  Date: July 25, 2023  Service Type:  Group Therapy  Facilitator: OPAL Mccrary        Response:  Patient did not participate in group.      OPAL Mccrary

## 2023-07-26 VITALS
HEART RATE: 122 BPM | TEMPERATURE: 98.2 F | WEIGHT: 184.2 LBS | DIASTOLIC BLOOD PRESSURE: 87 MMHG | BODY MASS INDEX: 27.92 KG/M2 | RESPIRATION RATE: 16 BRPM | HEIGHT: 68 IN | SYSTOLIC BLOOD PRESSURE: 139 MMHG | OXYGEN SATURATION: 97 %

## 2023-07-26 LAB — LAMOTRIGINE SERPL-MCNC: <0.9 UG/ML

## 2023-07-26 PROCEDURE — G0378 HOSPITAL OBSERVATION PER HR: HCPCS

## 2023-07-26 PROCEDURE — 250N000013 HC RX MED GY IP 250 OP 250 PS 637: Performed by: NURSE PRACTITIONER

## 2023-07-26 PROCEDURE — 99239 HOSP IP/OBS DSCHRG MGMT >30: CPT

## 2023-07-26 RX ORDER — HYDROXYZINE HYDROCHLORIDE 50 MG/1
50 TABLET, FILM COATED ORAL 2 TIMES DAILY PRN
Status: DISCONTINUED | OUTPATIENT
Start: 2023-07-26 | End: 2023-07-26 | Stop reason: HOSPADM

## 2023-07-26 RX ORDER — HYDROXYZINE HYDROCHLORIDE 50 MG/1
25 TABLET, FILM COATED ORAL 2 TIMES DAILY PRN
Qty: 30 TABLET | Refills: 0 | Status: SHIPPED | OUTPATIENT
Start: 2023-07-26

## 2023-07-26 RX ORDER — LAMOTRIGINE 25 MG/1
TABLET ORAL
Qty: 54 TABLET | Refills: 0 | Status: SHIPPED | OUTPATIENT
Start: 2023-07-26 | End: 2023-08-25

## 2023-07-26 RX ADMIN — DIAZEPAM 5 MG: 5 TABLET ORAL at 16:00

## 2023-07-26 RX ADMIN — LAMOTRIGINE 25 MG: 25 TABLET ORAL at 09:17

## 2023-07-26 RX ADMIN — DIAZEPAM 5 MG: 5 TABLET ORAL at 09:25

## 2023-07-26 RX ADMIN — DIAZEPAM 5 MG: 5 TABLET ORAL at 13:27

## 2023-07-26 ASSESSMENT — ACTIVITIES OF DAILY LIVING (ADL)
ADLS_ACUITY_SCORE: 35

## 2023-07-26 NOTE — DISCHARGE INSTRUCTIONS
Aftercare Plan    Follow up with established providers and supports as scheduled. Continue taking medications as prescribed. Abstain from drugs and alcohol. Utilize your [facility] mental [facility] crisis team as needed. They are available 24/7. Contact information is listed below.     The following appointment(s) and/or referral(s) were made on your behalf. If you need to make changes or cancel please contact the clinic/provider directly.     Date: Friday, 7/28/2023  Time: 1:00 pm - 2:00 pm  Provider: Bibi Rios  Location: [facility], [address] [place] [address], [address], [place] [address]  Phone: [phone]  Type: Teletherapy  You will receive information via email, for your appointment.     If I am feeling unsafe or I am in a crisis, I will:   Contact my established care providers   Call the National Suicide Prevention Lifeline: 360.788.4246   Go to the nearest emergency room   Call 911     Warning signs that I or other people might notice when a crisis is developing for me: changes to sleep, appetite or mood, increased anger, agitation or irritability, feeling depressed or hopeless, spending more time alone or talking less, increased crying, decreased productivity, seeing or hearing things that aren't there, thoughts of not wanting to live anymore or of actually killing myself, thoughts of hurting others    Things I am able to do on my own to cope or help me feel better: watching a favorite tv show or movie, listening to music I enjoy, going outside and breathing fresh air, going for a walk or exercising, taking a shower or bath, a cold or hot beverage, a healthy snack, drawing/coloring/painting, journaling, singing or dancing, deep breathing     I can try practicing square breathing when I begin to feel anxious - inhale through the nose for the count of 4 and the first line on the square. Exhale through the mouth for the count of 4 for the second line of the square. Repeat to complete the square. Repeat  the square as many times as needed.    I can also use my five senses to practice mindfulness and grounding. What are five things I can see, four things I can hear, three things I can feel, two things I can smell, and one thing I can taste.     Things that I am able to do with others to cope or help me feel better: sometimes just talking or spending time with someone else, sharing a meal or having coffee, watching a movie or playing a game, going for a walk or exercising    I can also use community resources including mental health hotlines, [facility] crisis teams, or apps.     Things I can use or do for distraction: movies/tv, music, reading, games, drawing/coloring/painting or other art, essential oils, exercise, cleaning/organizing, puzzles, crossword puzzles, word search, Sudoku       I can also download a meditation or relaxation dougie, like Calm, Headspace, or Insight Timer (all three offer a free version)    Changes I can make to support my mental health and wellness: Attend scheduled mental health therapy and psychiatric appointments. Take my medications as prescribed. Maintain a daily schedule/routine. Abstain from all mood altering substances, including drugs, alcohol, or medications not currently prescribed to me. Implement a self-care routine.      People in my life that I can ask for help: friends or family, trusted teachers/staff/colleagues, trusted members of my community or place of [employer], mental health crisis lines, or 911    Your [facility] has a mental health crisis team you can call 24/7: [facility] Adult, [phone]    Other things that are important when I m in crisis: to remember that the feelings I am having right now are temporary, and it won't feel like this forever, and that it is okay and important to ask for help    Crisis Lines  Crisis Text Line  Text 998745  You will be connected with a trained live crisis counselor to provide support.    National Hope Line  1.800.SUICIDE  "[2611992]      Community Resources  Fast Tracker  Linking people to mental health and substance use disorder resources  [employer]ck[employer]iftern.org     [place] Mental Health Warm Line  Peer to peer support  Monday thru Saturday, 12 pm to 10 pm  349.645.2111 or 2.688.151.8086  Text \"Support\" to 08854    National [place] on Mental Illness (RICK)  268.603.7343 or 1.888.RICK.HELPS      Mental Health Apps  My3  https://Postini.org/    VirtualHopeBox  https://Godengo/apps/virtual-hope-box/      Additional Information  Today you were seen by a licensed mental health professional through Triage and Transition services, [facility] ([facility])  for a crisis assessment in the Emergency Department at [facility].  It is recommended that you follow up with your established providers (psychiatrist, mental health therapist, and/or primary care doctor - as relevant) as soon as possible. Coordinators from [facility] will be calling you in the next 24-48 hours to ensure that you have the resources you need.  You can also contact [facility] coordinators directly at [phone]. You may have been scheduled for or offered an appointment with a mental health provider. [facility] maintains an extensive network of licensed behavioral health providers to connect patients with the services they need.  We do not charge providers a fee to participate in our referral network.  We match patients with providers based on a patient's specific needs, insurance coverage, and location.  Our first effort will be to refer you to a provider within your care system, and will utilize providers outside your care system as needed.      "

## 2023-07-26 NOTE — PROGRESS NOTES
Pt slept uneventfully through the night with no signs of physical discomfort, pain or emotional distress observed overnight.  Pt woke twice to have a few bites of snacks/drink water, then settled back to sleep.

## 2023-07-26 NOTE — PROGRESS NOTES
[facility] Progress Note    Client Name: Jose Antonio Dee  Date: July 26, 2023  Service Type:  Group Therapy  Facilitator: ELADIO Damon, [facility]          Response:  Patient did not participate in group.      ERIN Guzman

## 2023-07-26 NOTE — PROGRESS NOTES
Patient agreeable to discharge plan. Discharge instructions reviewed with patient including follow-up care plan. Medications reviewed. Reviewed safety plan and outpatient resources. Denies SI and HI. All belongings that were brought into the hospital have been returned to patient. Escorted off the unit accompanied by [facility] staff. Discharged to home via cab.

## 2023-07-26 NOTE — PROGRESS NOTES
Pt had been calm and cooperative all shift. Pt spent most of the shift resting in her recliner. Pt was visible on the unit but for the most part she was isolative and withdrawn. Plan to stay obs another night and reassess in the morning. Pt was compliant with meds and cares. No acute changes noted over this shift.

## 2023-07-26 NOTE — PROGRESS NOTES
"Triage & Transition Services [facility]     Progress Note    Patient: Jose Antonio goes by \"Jose Antonio,\" uses she/her pronouns  Date of Service: July 26, 2023  Site of Service:   Patient was seen in-person.     Presenting problem:  Please see initial [facility] Crisis Assessment completed by VICKIE Crow on 7/24/23 for complete assessment information. Notable concerns include intense anxiety, increased use of etoh, possibe withdrawal symptoms including disorganized thought process.     Individuals Present: Jose Antonio & ERIN Guzman    Session start: 2:35 PM    Session end: 2:55 PM    Session duration in minutes: 30  CPT utilized: 64287 - Psychotherapy (with patient) - 30 (16-37*) min        Current Presentation:   Pt appears well rested, with bright affect and much more organized thoughts compared to her initial presentation.  She states that she is feeling much better and ready to go home. She is interested in having a therapy appointment scheduled for her and plans to follow up with her established psychiatric provider regarding the changes to her medications. She was willing to actively engage in discussion around aftercare plans and indicates that she plans to stay with her dtr for a bit for added support.  Pt indicates that she has a good support system but knows that she needs to do a better job of actually reaching out to them for help.     Additional Collateral Information:  N/a     Mental Status Exam     Affect: Appropriate   Appearance: Appropriate    Attention Span/Concentration: Attentive  Eye Contact: Engaged   Fund of Knowledge: Appropriate    Language /Speech Content: Fluent   Language /Speech Volume: Normal    Language /Speech Rate/Productions: Normal    Recent Memory: Intact   Remote Memory: Intact   Mood: Normal    Orientation to Person: Yes    Orientation to Place: Yes   Orientation to Time of Day: Yes    Orientation to Date: Yes    Situation (Do they understand why they are here?): Yes  "   Psychomotor Behavior: Normal    Thought Content: Clear   Thought Form: Goal Directed and Intact     Diagnosis:    Mood disorder (H) F39    Alcohol withdrawal syndrome with complication (H) F10.939    Alcohol use disorder, severe, dependence (H)        Therapeutic Intervention(s):   Provided active listening, unconditional positive regard, and validation. Engaged in safety planning.  Engaged in guided discovery, explored patient's perspectives and helped expand them through socratic dialogue. Coached on coping techniques/relaxation skills to help improve distress tolerance and managing intense emotions. Reviewed healthy living that supports positive mental health, including looking at sleep hygiene, regular movement, nutrition, and regular socialization. Provided positive reinforcement for progress towards goals, gains in knowledge, and application of skills previously taught.     Treatment Objective(s) Addressed:   The focus of this session was on rapport building, identifying and practicing coping strategies, identifying an appropriate aftercare plan, assessing safety, identifying treatment goals, and identifying additional supports.     Progress Towards Goals:   Patient reports improving symptoms. Patient is making progress towards treatment goals as evidenced by her ability to think more clearly and engage appropriately in discharge planning.     Case Management:   Therapy appointment scheduled for pt.     Plan and Clinical Summary and Substantiation of Recommendations:   Discharge: Pt appears well rested, with bright affect and much more organized thoughts compared to her initial presentation.  She was willing to actively engage in discussion around aftercare plans and indicates that she plans to stay with her dtr for a bit for added support.  Pt indicates that she has a good support system but knows that she needs to do a better job of actually reaching out to them for help.   Pt denied thoughts or intent for  suicide, is determined to keep herself safe, reach out for help as needed and attend newly scheduled therapy appointment and follow up with her psychiatric provider.  Pt appears ready for return home at this time.     Attending concurs with disposition: Yes         Phan Hancock, [facility]     Aftercare Plan    Follow up with established providers and supports as scheduled. Continue taking medications as prescribed. Abstain from drugs and alcohol. Utilize your [facility] crisis team as needed. They are available 24/7. Contact information is listed below.     The following appointment(s) and/or referral(s) were made on your behalf. If you need to make changes or cancel please contact the clinic/provider directly.     Date: Friday, 7/28/2023  Time: 1:00 pm - 2:00 pm  Provider: Bibi Rios  Location: [facility], [address] [place] [address], [address], [place] [address]  Phone: [phone]  Type: Teletherapy  You will receive information via email, for your appointment.     If I am feeling unsafe or I am in a crisis, I will:   Contact my established care providers   Call the National Suicide Prevention Lifeline: 528.954.8837   Go to the nearest emergency room   Call 911     Warning signs that I or other people might notice when a crisis is developing for me: changes to sleep, appetite or mood, increased anger, agitation or irritability, feeling depressed or hopeless, spending more time alone or talking less, increased crying, decreased productivity, seeing or hearing things that aren't there, thoughts of not wanting to live anymore or of actually killing myself, thoughts of hurting others    Things I am able to do on my own to cope or help me feel better: watching a favorite tv show or movie, listening to music I enjoy, going outside and breathing fresh air, going for a walk or exercising, taking a shower or bath, a cold or hot beverage, a healthy snack, drawing/coloring/painting, journaling,  singing or dancing, deep breathing     I can try practicing square breathing when I begin to feel anxious - inhale through the nose for the count of 4 and the first line on the square. Exhale through the mouth for the count of 4 for the second line of the square. Repeat to complete the square. Repeat the square as many times as needed.    I can also use my five senses to practice mindfulness and grounding. What are five things I can see, four things I can hear, three things I can feel, two things I can smell, and one thing I can taste.     Things that I am able to do with others to cope or help me feel better: sometimes just talking or spending time with someone else, sharing a meal or having coffee, watching a movie or playing a game, going for a walk or exercising    I can also use community resources including mental health hotlines, [facility] crisis teams, or apps.     Things I can use or do for distraction: movies/tv, music, reading, games, drawing/coloring/painting or other art, essential oils, exercise, cleaning/organizing, puzzles, crossword puzzles, word search, Sudoku       I can also download a meditation or relaxation dougie, like Calm, Headspace, or Insight Timer (all three offer a free version)    Changes I can make to support my mental health and wellness: Attend scheduled mental health therapy and psychiatric appointments. Take my medications as prescribed. Maintain a daily schedule/routine. Abstain from all mood altering substances, including drugs, alcohol, or medications not currently prescribed to me. Implement a self-care routine.      People in my life that I can ask for help: friends or family, trusted teachers/staff/colleagues, trusted members of my community or place of [employer], mental health crisis lines, or 911    Your [facility] has a mental health crisis team you can call 24/7: [facility], [phone]    Other things that are important when I m in crisis: to remember that the feelings  "I am having right now are temporary, and it won't feel like this forever, and that it is okay and important to ask for help    Crisis Lines  Crisis Text Line  Text 251961  You will be connected with a trained live crisis counselor to provide support.    Por katia, texto  JIMMY a 404095 o texto a 442-AYUDAME en WhatsApp    National Hope Line  1.800.SUICIDE [5446391]      Community Resources  Fast Tracker  Linking people to mental health and substance use disorder resources  Phonitive - Touchalize.LXSN     [place] Mental Health Warm Line  Peer to peer support  Monday thru Saturday, 12 pm to 10 pm  866.571.7580 or 8.830.564.4878  Text \"Support\" to 99946    National [place] on Mental Illness (RICK)  358.078.3738 or 1.888.RICK.HELPS      Mental Health Apps  My3  https://Krauttools.org/    VirtualHopeBox  https://[employer]/apps/virtual-hope-box/      Additional Information  Today you were seen by a licensed mental health professional through Triage and Transition services, [facility] ([facility])  for a crisis assessment in the Emergency Department at [facility].  It is recommended that you follow up with your established providers (psychiatrist, mental health therapist, and/or primary care doctor - as relevant) as soon as possible. Coordinators from [facility] will be calling you in the next 24-48 hours to ensure that you have the resources you need.  You can also contact [facility] coordinators directly at [phone]. You may have been scheduled for or offered an appointment with a mental health provider. [facility] maintains an extensive network of licensed behavioral health providers to connect patients with the services they need.  We do not charge providers a fee to participate in our referral network.  We match patients with providers based on a patient's specific needs, insurance coverage, and location.  Our first effort will be to refer you to a provider within your care system, and will utilize " providers outside your care system as needed.

## 2023-07-26 NOTE — ED PROVIDER NOTES
"[facility] Unit - Psychiatric Observation Discharge Summary  [facility] Emergency Department  Discharge Date: 7/26/2023    Jose Antonio Dee MRN: [id number]   Age: 53 year old YOB: 1970     Brief HPI & Initial ED Course     Chief Complaint   Patient presents with    Alcohol Problem     HPI  Jose Antonio Dee is a 53 year old female with history notable for alcohol use disorder, anxiety, and mood disorder who presented to the emergency department with increasing anxiety and alcohol use in the context of social stressors and medication nonadherence. In the emergency department, Jose Antonio was determined to be medically stable and transferred to the [facility] unit for psychiatric assessment. Jose Antonio was initially seen by Kendy Vega CNP and lamotrigine was restarted. They have currently been in the emergency department for 48 hours.     Today, Jose Antonio reports that she feels as though she's ready to go home. She reports that her anxiety and depression have continued to decrease since arriving at [facility]. She feels like her mood is still \"up and down\" but that she feels \"more normal.\" Discussed how lamotrigine was restarted and as this is increased her mood lability will continue to stabilize. Jose Antonio notes that when she consistently takes her medications, she feels as though they're beneficial. She denies suicidal thoughts and denies AH/VH. She declines interventions and support surrounding her drinking. She feels as though her recent alcohol use was largely situational and related to ongoing relationship difficulties with her significant other. She feels as though she offers a ton of support to her adult children but is not given the same support in return. Discussed how Jose Antonio could benefit from psychotherapy, she is in agreement with this recommendation. She notes that her family and friends are her primary support. She works as a  and is looking forward to returning to this. Discharge to home with outpatient " "supports.         Physical Examination   BP: 139/87  Pulse: (!) 122  Temp: 98.2  F (36.8  C)  Resp: 16  Height: 172.7 cm (5' 8\")  Weight: 83.6 kg (184 lb 3.2 oz)  SpO2: 97 %    Physical Exam  General: Appears stated age.   Neuro: Alert and fully oriented. Extremities appear to demonstrate normal strength on visual inspection.   Integumentary/Skin: no rash visualized, normal color    Psychiatric Examination   Appearance: awake, alert, adequately groomed, dressed in hospital scrubs, and appeared as age stated  Attitude:  cooperative  Eye Contact:  good  Mood:  better and good  Affect:  mood congruent  Speech:  clear, coherent and normal prosody  Psychomotor Behavior:  no evidence of tardive dyskinesia, dystonia, or tics and intact station, gait and muscle tone  Thought Process:  logical and goal oriented  Associations:  no loose associations  Thought Content:  no evidence of suicidal ideation or homicidal ideation, no evidence of psychotic thought, no auditory hallucinations present, and no visual hallucinations present  Insight:  fair  Judgement:  fair  Oriented to:  time, person, and place  Attention Span and Concentration:  fair  Recent and Remote Memory:  intact  Language: able to name/identify objects without impairment  Fund of Knowledge: intact with awareness of current and past events    Results        Labs Ordered and Resulted from Time of ED Arrival to Time of ED Departure   COMPREHENSIVE METABOLIC PANEL - Abnormal       Result Value    Sodium 137      Potassium 3.0 (*)     Chloride 97 (*)     Carbon Dioxide (CO2) 29      Anion Gap 11      Urea Nitrogen 7.6      Creatinine 0.77      Calcium 8.7      Glucose 164 (*)     Alkaline Phosphatase 48      AST 30      ALT 44      Protein Total 7.0      Albumin 4.3      Bilirubin Total 0.4      GFR Estimate >90     CBC WITH PLATELETS AND DIFFERENTIAL - Abnormal    WBC Count 10.5      RBC Count 4.25      Hemoglobin 12.8      Hematocrit 38.2      MCV 90      MCH 30.1   "    MCHC 33.5      RDW 12.3      Platelet Count 265      % Neutrophils 80      % Lymphocytes 13      % Monocytes 6      % Eosinophils 1      % Basophils 0      % Immature Granulocytes 0      NRBCs per 100 WBC 0      Absolute Neutrophils 8.4 (*)     Absolute Lymphocytes 1.4      Absolute Monocytes 0.6      Absolute Eosinophils 0.1      Absolute Basophils 0.0      Absolute Immature Granulocytes 0.0      Absolute NRBCs 0.0     DRUG ABUSE SCREEN 77 URINE (FL, RH, SH) - Abnormal    Amphetamines Urine Screen Negative      Barbituates Urine Screen Negative      Benzodiazepine Urine Screen Negative      Cannabinoids Urine Screen Positive (*)     Opiates Urine Screen Negative      PCP Urine Screen Negative      Cocaine Urine Screen Negative     LAMOTRIGINE LEVEL - Abnormal    Lamotrigine <0.9 (*)    ETHYL ALCOHOL LEVEL - Normal    Alcohol ethyl <0.01     MAGNESIUM - Normal    Magnesium 2.3         Observation Course   The patient was found to have a psychiatric condition that would benefit from an observation stay in the emergency department for further psychiatric stabilization and/or coordination of a safe disposition. The plan upon observation admission included serial assessments of psychiatric condition, potential administration of medications if indicated, further disposition pending the patient's psychiatric course during the monitoring period.     Serial assessments of the patient's psychiatric condition were performed. Nursing notes were reviewed. During the observation period, the patient did not require medications for agitation, and did not require restraints/seclusion for patient and/or provider safety.     After a period of working with the treatment team on the [facility] unit, the patient's mental state improved to allow a safe transition to outpatient care. After counseling on the diagnosis, work-up, and treatment plan, the patient was discharged. Close follow-up with a psychiatrist and/or therapist was  recommended and community psychiatric resources were provided. Patient is to return to the ED if any urgent or potentially life-threatening concerns.     Discharge Diagnoses:   Final diagnoses:   Mood disorder (H)   Alcohol withdrawal syndrome with complication (H)   Alcohol use disorder, severe, dependence (H)       Treatment Plan:  -Continue lamotrigine 25mg for 2 weeks and then increase to 50mg further targeting mood stabilization. Patient will need to follow-up with outpatient psychiatrist for additional dose optimization  -Start hydroxyzine 50mg twice daily as needed for anxiety  -Patient would benefit from Rule 25 although is declining B[facility] services at this time  -Referral for psychotherapy  -Medication education provided this visit including but not limited to: Rationale for medication, importance of medication adherence, medication interactions, common medication side effects, benefits of medications.  -Individual psychotherapy and outpatient psychiatric care recommended for additional support and ongoing development of nonpharmacologic coping skills and strategies.    -Problem focused supportive therapy and education provided today related to patient's current and acute stressors, symptoms, and diagnoses.   -Discharge to home with outpatient supports       At the time of discharge, the patient's acute suicide risk was determined to be low due to the following factors: Reduction in the intensity of mood/anxiety symptoms that preceded the admission, denial of suicidal thoughts, denies feeling helpless or helpless, not currently under the influence of alcohol or illicit substances, denies experiencing command hallucinations, no immediate access to firearms. The patient's acute risk could be higher if noncompliant with their treatment plan, medications, follow-up appointments or using illicit substances or alcohol. Protective factors include: social supports, children, stable housing, employment.    I spent more  than 30 minutes on discharge day activities.    --  CECILIA Watts CNP  [facility] EMERGENCY DEPT  [facility] Unit       Tonya Hare APRN CNP  07/26/23 1397